# Patient Record
Sex: MALE | Race: WHITE | NOT HISPANIC OR LATINO | URBAN - METROPOLITAN AREA
[De-identification: names, ages, dates, MRNs, and addresses within clinical notes are randomized per-mention and may not be internally consistent; named-entity substitution may affect disease eponyms.]

---

## 2017-03-03 ENCOUNTER — EMERGENCY (EMERGENCY)
Facility: HOSPITAL | Age: 57
LOS: 1 days | Discharge: HOME | End: 2017-03-03
Attending: EMERGENCY MEDICINE
Payer: MEDICAID

## 2017-03-03 VITALS
SYSTOLIC BLOOD PRESSURE: 170 MMHG | RESPIRATION RATE: 16 BRPM | HEIGHT: 71 IN | HEART RATE: 78 BPM | OXYGEN SATURATION: 100 % | DIASTOLIC BLOOD PRESSURE: 110 MMHG | WEIGHT: 184.97 LBS | TEMPERATURE: 98 F

## 2017-03-03 VITALS
RESPIRATION RATE: 18 BRPM | DIASTOLIC BLOOD PRESSURE: 85 MMHG | TEMPERATURE: 98 F | OXYGEN SATURATION: 100 % | HEART RATE: 82 BPM | SYSTOLIC BLOOD PRESSURE: 135 MMHG

## 2017-03-03 LAB
APPEARANCE UR: CLEAR — SIGNIFICANT CHANGE UP
BACTERIA # UR AUTO: ABNORMAL /HPF
BASOPHILS # BLD AUTO: 0.1 K/UL — SIGNIFICANT CHANGE UP (ref 0–0.2)
BASOPHILS NFR BLD AUTO: 0.8 % — SIGNIFICANT CHANGE UP (ref 0–2)
BILIRUB UR-MCNC: NEGATIVE — SIGNIFICANT CHANGE UP
COLOR SPEC: YELLOW — SIGNIFICANT CHANGE UP
DIFF PNL FLD: ABNORMAL
EOSINOPHIL # BLD AUTO: 0 K/UL — SIGNIFICANT CHANGE UP (ref 0–0.5)
EOSINOPHIL NFR BLD AUTO: 0.2 % — SIGNIFICANT CHANGE UP (ref 0–6)
EPI CELLS # UR: ABNORMAL (ref 0–10)
GLUCOSE UR QL: NEGATIVE — SIGNIFICANT CHANGE UP
HCT VFR BLD CALC: 48.8 % — SIGNIFICANT CHANGE UP (ref 39–50)
HGB BLD-MCNC: 16.2 G/DL — SIGNIFICANT CHANGE UP (ref 13–17)
KETONES UR-MCNC: NEGATIVE — SIGNIFICANT CHANGE UP
LEUKOCYTE ESTERASE UR-ACNC: ABNORMAL
LYMPHOCYTES # BLD AUTO: 1.8 K/UL — SIGNIFICANT CHANGE UP (ref 1–3.3)
LYMPHOCYTES # BLD AUTO: 15 % — SIGNIFICANT CHANGE UP (ref 13–44)
MCHC RBC-ENTMCNC: 26.7 PG — LOW (ref 27–34)
MCHC RBC-ENTMCNC: 33.3 GM/DL — SIGNIFICANT CHANGE UP (ref 32–36)
MCV RBC AUTO: 80.4 FL — SIGNIFICANT CHANGE UP (ref 80–100)
MONOCYTES # BLD AUTO: 0.5 K/UL — SIGNIFICANT CHANGE UP (ref 0–0.9)
MONOCYTES NFR BLD AUTO: 4.2 % — SIGNIFICANT CHANGE UP (ref 2–14)
NEUTROPHILS # BLD AUTO: 9.7 K/UL — HIGH (ref 1.8–7.4)
NEUTROPHILS NFR BLD AUTO: 79.8 % — HIGH (ref 43–77)
NITRITE UR-MCNC: NEGATIVE — SIGNIFICANT CHANGE UP
PH UR: 5 — SIGNIFICANT CHANGE UP (ref 4.8–8)
PLATELET # BLD AUTO: 255 K/UL — SIGNIFICANT CHANGE UP (ref 150–400)
PROT UR-MCNC: NEGATIVE — SIGNIFICANT CHANGE UP
RBC # BLD: 6.07 M/UL — HIGH (ref 4.2–5.8)
RBC # FLD: 12.8 % — SIGNIFICANT CHANGE UP (ref 10.3–14.5)
RBC CASTS # UR COMP ASSIST: SIGNIFICANT CHANGE UP /HPF (ref 0–2)
SP GR SPEC: 1.02 — SIGNIFICANT CHANGE UP (ref 1.01–1.02)
UROBILINOGEN FLD QL: NEGATIVE — SIGNIFICANT CHANGE UP
WBC # BLD: 12.2 K/UL — HIGH (ref 3.8–10.5)
WBC # FLD AUTO: 12.2 K/UL — HIGH (ref 3.8–10.5)
WBC UR QL: SIGNIFICANT CHANGE UP /HPF (ref 0–5)

## 2017-03-03 PROCEDURE — 99285 EMERGENCY DEPT VISIT HI MDM: CPT | Mod: 25

## 2017-03-03 RX ORDER — SODIUM CHLORIDE 9 MG/ML
1000 INJECTION INTRAMUSCULAR; INTRAVENOUS; SUBCUTANEOUS ONCE
Qty: 0 | Refills: 0 | Status: COMPLETED | OUTPATIENT
Start: 2017-03-03 | End: 2017-03-03

## 2017-03-03 RX ORDER — SODIUM CHLORIDE 9 MG/ML
1000 INJECTION INTRAMUSCULAR; INTRAVENOUS; SUBCUTANEOUS
Qty: 0 | Refills: 0 | Status: DISCONTINUED | OUTPATIENT
Start: 2017-03-03 | End: 2017-03-07

## 2017-03-03 RX ORDER — KETOROLAC TROMETHAMINE 30 MG/ML
30 SYRINGE (ML) INJECTION ONCE
Qty: 0 | Refills: 0 | Status: DISCONTINUED | OUTPATIENT
Start: 2017-03-03 | End: 2017-03-03

## 2017-03-03 RX ORDER — SODIUM CHLORIDE 9 MG/ML
3 INJECTION INTRAMUSCULAR; INTRAVENOUS; SUBCUTANEOUS ONCE
Qty: 0 | Refills: 0 | Status: COMPLETED | OUTPATIENT
Start: 2017-03-03 | End: 2017-03-03

## 2017-03-03 RX ADMIN — SODIUM CHLORIDE 3 MILLILITER(S): 9 INJECTION INTRAMUSCULAR; INTRAVENOUS; SUBCUTANEOUS at 23:47

## 2017-03-03 RX ADMIN — Medication 30 MILLIGRAM(S): at 23:47

## 2017-03-03 RX ADMIN — SODIUM CHLORIDE 1000 MILLILITER(S): 9 INJECTION INTRAMUSCULAR; INTRAVENOUS; SUBCUTANEOUS at 23:49

## 2017-03-04 LAB
ALBUMIN SERPL ELPH-MCNC: 4.3 G/DL — SIGNIFICANT CHANGE UP (ref 3.5–5)
ALP SERPL-CCNC: 82 U/L — SIGNIFICANT CHANGE UP (ref 40–120)
ALT FLD-CCNC: 26 U/L DA — SIGNIFICANT CHANGE UP (ref 10–60)
ANION GAP SERPL CALC-SCNC: 9 MMOL/L — SIGNIFICANT CHANGE UP (ref 5–17)
AST SERPL-CCNC: 20 U/L — SIGNIFICANT CHANGE UP (ref 10–40)
BILIRUB SERPL-MCNC: 0.8 MG/DL — SIGNIFICANT CHANGE UP (ref 0.2–1.2)
BUN SERPL-MCNC: 15 MG/DL — SIGNIFICANT CHANGE UP (ref 7–18)
CALCIUM SERPL-MCNC: 9 MG/DL — SIGNIFICANT CHANGE UP (ref 8.4–10.5)
CHLORIDE SERPL-SCNC: 106 MMOL/L — SIGNIFICANT CHANGE UP (ref 96–108)
CO2 SERPL-SCNC: 27 MMOL/L — SIGNIFICANT CHANGE UP (ref 22–31)
CREAT SERPL-MCNC: 1.8 MG/DL — HIGH (ref 0.5–1.3)
GLUCOSE SERPL-MCNC: 107 MG/DL — HIGH (ref 70–99)
LIDOCAIN IGE QN: 121 U/L — SIGNIFICANT CHANGE UP (ref 73–393)
POTASSIUM SERPL-MCNC: 4.4 MMOL/L — SIGNIFICANT CHANGE UP (ref 3.5–5.3)
POTASSIUM SERPL-SCNC: 4.4 MMOL/L — SIGNIFICANT CHANGE UP (ref 3.5–5.3)
PROT SERPL-MCNC: 7.9 G/DL — SIGNIFICANT CHANGE UP (ref 6–8.3)
SODIUM SERPL-SCNC: 142 MMOL/L — SIGNIFICANT CHANGE UP (ref 135–145)

## 2017-03-04 PROCEDURE — 96374 THER/PROPH/DIAG INJ IV PUSH: CPT

## 2017-03-04 PROCEDURE — 80053 COMPREHEN METABOLIC PANEL: CPT

## 2017-03-04 PROCEDURE — 99284 EMERGENCY DEPT VISIT MOD MDM: CPT | Mod: 25

## 2017-03-04 PROCEDURE — 85027 COMPLETE CBC AUTOMATED: CPT

## 2017-03-04 PROCEDURE — 96375 TX/PRO/DX INJ NEW DRUG ADDON: CPT

## 2017-03-04 PROCEDURE — 87086 URINE CULTURE/COLONY COUNT: CPT

## 2017-03-04 PROCEDURE — 81001 URINALYSIS AUTO W/SCOPE: CPT

## 2017-03-04 PROCEDURE — 83690 ASSAY OF LIPASE: CPT

## 2017-03-04 PROCEDURE — 74176 CT ABD & PELVIS W/O CONTRAST: CPT | Mod: 26

## 2017-03-04 PROCEDURE — 74176 CT ABD & PELVIS W/O CONTRAST: CPT

## 2017-03-04 RX ORDER — MORPHINE SULFATE 50 MG/1
4 CAPSULE, EXTENDED RELEASE ORAL ONCE
Qty: 0 | Refills: 0 | Status: DISCONTINUED | OUTPATIENT
Start: 2017-03-04 | End: 2017-03-04

## 2017-03-04 RX ORDER — ONDANSETRON 8 MG/1
4 TABLET, FILM COATED ORAL ONCE
Qty: 0 | Refills: 0 | Status: COMPLETED | OUTPATIENT
Start: 2017-03-04 | End: 2017-03-04

## 2017-03-04 RX ORDER — TAMSULOSIN HYDROCHLORIDE 0.4 MG/1
1 CAPSULE ORAL
Qty: 30 | Refills: 0 | OUTPATIENT
Start: 2017-03-04 | End: 2017-04-03

## 2017-03-04 RX ADMIN — MORPHINE SULFATE 4 MILLIGRAM(S): 50 CAPSULE, EXTENDED RELEASE ORAL at 01:09

## 2017-03-04 RX ADMIN — ONDANSETRON 4 MILLIGRAM(S): 8 TABLET, FILM COATED ORAL at 01:10

## 2017-03-04 NOTE — ED PROVIDER NOTE - PROGRESS NOTE DETAILS
pt feeling much better, advised not to take Ibuprofen, to f/u with urologist. pt was told that he has uric acid stone

## 2017-03-05 LAB
CULTURE RESULTS: NO GROWTH — SIGNIFICANT CHANGE UP
SPECIMEN SOURCE: SIGNIFICANT CHANGE UP

## 2017-03-07 DIAGNOSIS — N23 UNSPECIFIED RENAL COLIC: ICD-10-CM

## 2017-03-07 DIAGNOSIS — N28.9 DISORDER OF KIDNEY AND URETER, UNSPECIFIED: ICD-10-CM

## 2017-03-07 DIAGNOSIS — M54.9 DORSALGIA, UNSPECIFIED: ICD-10-CM

## 2017-03-07 DIAGNOSIS — F17.210 NICOTINE DEPENDENCE, CIGARETTES, UNCOMPLICATED: ICD-10-CM

## 2017-03-08 ENCOUNTER — EMERGENCY (EMERGENCY)
Facility: HOSPITAL | Age: 57
LOS: 1 days | Discharge: HOME | End: 2017-03-08
Attending: EMERGENCY MEDICINE
Payer: MEDICAID

## 2017-03-08 VITALS
HEART RATE: 76 BPM | RESPIRATION RATE: 16 BRPM | OXYGEN SATURATION: 98 % | DIASTOLIC BLOOD PRESSURE: 68 MMHG | SYSTOLIC BLOOD PRESSURE: 103 MMHG | TEMPERATURE: 98 F

## 2017-03-08 VITALS
RESPIRATION RATE: 18 BRPM | WEIGHT: 179.9 LBS | DIASTOLIC BLOOD PRESSURE: 62 MMHG | TEMPERATURE: 98 F | HEIGHT: 71 IN | SYSTOLIC BLOOD PRESSURE: 164 MMHG | HEART RATE: 66 BPM | OXYGEN SATURATION: 99 %

## 2017-03-08 DIAGNOSIS — N23 UNSPECIFIED RENAL COLIC: ICD-10-CM

## 2017-03-08 DIAGNOSIS — F17.210 NICOTINE DEPENDENCE, CIGARETTES, UNCOMPLICATED: ICD-10-CM

## 2017-03-08 LAB
ANION GAP SERPL CALC-SCNC: 10 MMOL/L — SIGNIFICANT CHANGE UP (ref 5–17)
APPEARANCE UR: CLEAR — SIGNIFICANT CHANGE UP
BACTERIA # UR AUTO: ABNORMAL /HPF
BASOPHILS # BLD AUTO: 0.1 K/UL — SIGNIFICANT CHANGE UP (ref 0–0.2)
BASOPHILS NFR BLD AUTO: 1 % — SIGNIFICANT CHANGE UP (ref 0–2)
BILIRUB UR-MCNC: NEGATIVE — SIGNIFICANT CHANGE UP
BUN SERPL-MCNC: 28 MG/DL — HIGH (ref 7–18)
CALCIUM SERPL-MCNC: 8.5 MG/DL — SIGNIFICANT CHANGE UP (ref 8.4–10.5)
CHLORIDE SERPL-SCNC: 104 MMOL/L — SIGNIFICANT CHANGE UP (ref 96–108)
CO2 SERPL-SCNC: 25 MMOL/L — SIGNIFICANT CHANGE UP (ref 22–31)
COLOR SPEC: YELLOW — SIGNIFICANT CHANGE UP
CREAT SERPL-MCNC: 1.68 MG/DL — HIGH (ref 0.5–1.3)
DIFF PNL FLD: ABNORMAL
EOSINOPHIL # BLD AUTO: 0.1 K/UL — SIGNIFICANT CHANGE UP (ref 0–0.5)
EOSINOPHIL NFR BLD AUTO: 0.8 % — SIGNIFICANT CHANGE UP (ref 0–6)
EPI CELLS # UR: ABNORMAL (ref 0–10)
GLUCOSE SERPL-MCNC: 92 MG/DL — SIGNIFICANT CHANGE UP (ref 70–99)
GLUCOSE UR QL: NEGATIVE — SIGNIFICANT CHANGE UP
HCT VFR BLD CALC: 45.1 % — SIGNIFICANT CHANGE UP (ref 39–50)
HGB BLD-MCNC: 15.8 G/DL — SIGNIFICANT CHANGE UP (ref 13–17)
KETONES UR-MCNC: NEGATIVE — SIGNIFICANT CHANGE UP
LEUKOCYTE ESTERASE UR-ACNC: NEGATIVE — SIGNIFICANT CHANGE UP
LYMPHOCYTES # BLD AUTO: 3.7 K/UL — HIGH (ref 1–3.3)
LYMPHOCYTES # BLD AUTO: 32.5 % — SIGNIFICANT CHANGE UP (ref 13–44)
MCHC RBC-ENTMCNC: 28.1 PG — SIGNIFICANT CHANGE UP (ref 27–34)
MCHC RBC-ENTMCNC: 34.9 GM/DL — SIGNIFICANT CHANGE UP (ref 32–36)
MCV RBC AUTO: 80.5 FL — SIGNIFICANT CHANGE UP (ref 80–100)
MONOCYTES # BLD AUTO: 0.9 K/UL — SIGNIFICANT CHANGE UP (ref 0–0.9)
MONOCYTES NFR BLD AUTO: 8.3 % — SIGNIFICANT CHANGE UP (ref 2–14)
NEUTROPHILS # BLD AUTO: 6.5 K/UL — SIGNIFICANT CHANGE UP (ref 1.8–7.4)
NEUTROPHILS NFR BLD AUTO: 57.4 % — SIGNIFICANT CHANGE UP (ref 43–77)
NITRITE UR-MCNC: NEGATIVE — SIGNIFICANT CHANGE UP
PH UR: 5 — SIGNIFICANT CHANGE UP (ref 4.8–8)
PLATELET # BLD AUTO: 238 K/UL — SIGNIFICANT CHANGE UP (ref 150–400)
POTASSIUM SERPL-MCNC: 4 MMOL/L — SIGNIFICANT CHANGE UP (ref 3.5–5.3)
POTASSIUM SERPL-SCNC: 4 MMOL/L — SIGNIFICANT CHANGE UP (ref 3.5–5.3)
PROT UR-MCNC: 15
RBC # BLD: 5.61 M/UL — SIGNIFICANT CHANGE UP (ref 4.2–5.8)
RBC # FLD: 12.7 % — SIGNIFICANT CHANGE UP (ref 10.3–14.5)
RBC CASTS # UR COMP ASSIST: SIGNIFICANT CHANGE UP /HPF (ref 0–2)
SODIUM SERPL-SCNC: 139 MMOL/L — SIGNIFICANT CHANGE UP (ref 135–145)
SP GR SPEC: 1.02 — SIGNIFICANT CHANGE UP (ref 1.01–1.02)
UROBILINOGEN FLD QL: NEGATIVE — SIGNIFICANT CHANGE UP
WBC # BLD: 11.3 K/UL — HIGH (ref 3.8–10.5)
WBC # FLD AUTO: 11.3 K/UL — HIGH (ref 3.8–10.5)
WBC UR QL: SIGNIFICANT CHANGE UP /HPF (ref 0–5)

## 2017-03-08 PROCEDURE — 99284 EMERGENCY DEPT VISIT MOD MDM: CPT | Mod: 25

## 2017-03-08 PROCEDURE — 80048 BASIC METABOLIC PNL TOTAL CA: CPT

## 2017-03-08 PROCEDURE — 96375 TX/PRO/DX INJ NEW DRUG ADDON: CPT

## 2017-03-08 PROCEDURE — 81001 URINALYSIS AUTO W/SCOPE: CPT

## 2017-03-08 PROCEDURE — 87086 URINE CULTURE/COLONY COUNT: CPT

## 2017-03-08 PROCEDURE — 96374 THER/PROPH/DIAG INJ IV PUSH: CPT

## 2017-03-08 PROCEDURE — 85027 COMPLETE CBC AUTOMATED: CPT

## 2017-03-08 RX ORDER — MORPHINE SULFATE 50 MG/1
4 CAPSULE, EXTENDED RELEASE ORAL ONCE
Qty: 0 | Refills: 0 | Status: DISCONTINUED | OUTPATIENT
Start: 2017-03-08 | End: 2017-03-08

## 2017-03-08 RX ORDER — SODIUM CHLORIDE 9 MG/ML
3 INJECTION INTRAMUSCULAR; INTRAVENOUS; SUBCUTANEOUS ONCE
Qty: 0 | Refills: 0 | Status: COMPLETED | OUTPATIENT
Start: 2017-03-08 | End: 2017-03-08

## 2017-03-08 RX ORDER — HYDROMORPHONE HYDROCHLORIDE 2 MG/ML
1 INJECTION INTRAMUSCULAR; INTRAVENOUS; SUBCUTANEOUS ONCE
Qty: 0 | Refills: 0 | Status: DISCONTINUED | OUTPATIENT
Start: 2017-03-08 | End: 2017-03-08

## 2017-03-08 RX ORDER — SODIUM CHLORIDE 9 MG/ML
1000 INJECTION INTRAMUSCULAR; INTRAVENOUS; SUBCUTANEOUS ONCE
Qty: 0 | Refills: 0 | Status: COMPLETED | OUTPATIENT
Start: 2017-03-08 | End: 2017-03-08

## 2017-03-08 RX ADMIN — MORPHINE SULFATE 4 MILLIGRAM(S): 50 CAPSULE, EXTENDED RELEASE ORAL at 02:50

## 2017-03-08 RX ADMIN — SODIUM CHLORIDE 1000 MILLILITER(S): 9 INJECTION INTRAMUSCULAR; INTRAVENOUS; SUBCUTANEOUS at 02:50

## 2017-03-08 RX ADMIN — MORPHINE SULFATE 4 MILLIGRAM(S): 50 CAPSULE, EXTENDED RELEASE ORAL at 03:26

## 2017-03-08 RX ADMIN — SODIUM CHLORIDE 1000 MILLILITER(S): 9 INJECTION INTRAMUSCULAR; INTRAVENOUS; SUBCUTANEOUS at 04:56

## 2017-03-08 RX ADMIN — SODIUM CHLORIDE 3 MILLILITER(S): 9 INJECTION INTRAMUSCULAR; INTRAVENOUS; SUBCUTANEOUS at 02:55

## 2017-03-08 RX ADMIN — HYDROMORPHONE HYDROCHLORIDE 1 MILLIGRAM(S): 2 INJECTION INTRAMUSCULAR; INTRAVENOUS; SUBCUTANEOUS at 04:39

## 2017-03-08 RX ADMIN — MORPHINE SULFATE 4 MILLIGRAM(S): 50 CAPSULE, EXTENDED RELEASE ORAL at 03:18

## 2017-03-08 RX ADMIN — HYDROMORPHONE HYDROCHLORIDE 1 MILLIGRAM(S): 2 INJECTION INTRAMUSCULAR; INTRAVENOUS; SUBCUTANEOUS at 04:09

## 2017-03-08 NOTE — ED PROVIDER NOTE - CONSTITUTIONAL, MLM
normal... Well appearing, well nourished, awake, alert, oriented to person, place, time/situation and in mild distress due to pain, pacing around in ED. well nourished, awake, alert, oriented to person, place, time/situation and in mild distress due to pain, pacing around stretcher

## 2017-03-08 NOTE — ED ADULT NURSE REASSESSMENT NOTE - NS ED NURSE REASSESS COMMENT FT1
Pt is alert and oriented x 3 upon en counter. Pt is resting comfortably in bed, offers no complaint at this time.

## 2017-03-08 NOTE — ED PROVIDER NOTE - MEDICAL DECISION MAKING DETAILS
57 y/o M pt w/ R flank pain consistent w/ renal colic. Labs and CT reviewed from 3/3/17, will check CBC and renal function today, check UA r.o infection, morphine for pain, reassess.

## 2017-03-08 NOTE — ED PROVIDER NOTE - OBJECTIVE STATEMENT
57 y/o M pt w/ PMHx of R side Renal Colic was BIB EMS to ED c/o sharp, severe R flank pain today. Pt reports that his pain radiates to his R testicle. Pt states that he took ibuprofen 600mg to no relief. Pt notes that he was seen at [facility] on 3/3/17 when the same pain had started; pt was diagnosed w/ 2mm R UVJ stone. Pt states that he did not fill his prescription for Flomax or Percocet and was only taking ibuprofen. Pt notes that he didn't f/u w/ Urology and that his pain was manageable until tonight. Pt denies fever, nausea, vomiting, or any other complaints. NKDA.

## 2017-03-09 LAB
CULTURE RESULTS: NO GROWTH — SIGNIFICANT CHANGE UP
SPECIMEN SOURCE: SIGNIFICANT CHANGE UP

## 2017-06-10 ENCOUNTER — EMERGENCY (EMERGENCY)
Facility: HOSPITAL | Age: 57
LOS: 1 days | Discharge: HOME | End: 2017-06-10
Attending: EMERGENCY MEDICINE
Payer: MEDICAID

## 2017-06-10 VITALS
DIASTOLIC BLOOD PRESSURE: 106 MMHG | HEART RATE: 80 BPM | RESPIRATION RATE: 20 BRPM | TEMPERATURE: 99 F | SYSTOLIC BLOOD PRESSURE: 143 MMHG | WEIGHT: 186.95 LBS | OXYGEN SATURATION: 98 % | HEIGHT: 71 IN

## 2017-06-10 VITALS
WEIGHT: 177.91 LBS | HEIGHT: 71 IN | SYSTOLIC BLOOD PRESSURE: 121 MMHG | DIASTOLIC BLOOD PRESSURE: 99 MMHG | RESPIRATION RATE: 18 BRPM | OXYGEN SATURATION: 100 % | HEART RATE: 81 BPM | TEMPERATURE: 99 F

## 2017-06-10 DIAGNOSIS — N23 UNSPECIFIED RENAL COLIC: ICD-10-CM

## 2017-06-10 LAB
ALBUMIN SERPL ELPH-MCNC: 3.5 G/DL — SIGNIFICANT CHANGE UP (ref 3.5–5)
ALP SERPL-CCNC: 67 U/L — SIGNIFICANT CHANGE UP (ref 40–120)
ALT FLD-CCNC: 33 U/L DA — SIGNIFICANT CHANGE UP (ref 10–60)
ANION GAP SERPL CALC-SCNC: 7 MMOL/L — SIGNIFICANT CHANGE UP (ref 5–17)
APPEARANCE UR: CLEAR — SIGNIFICANT CHANGE UP
AST SERPL-CCNC: 24 U/L — SIGNIFICANT CHANGE UP (ref 10–40)
BASOPHILS # BLD AUTO: 0.1 K/UL — SIGNIFICANT CHANGE UP (ref 0–0.2)
BASOPHILS NFR BLD AUTO: 0.6 % — SIGNIFICANT CHANGE UP (ref 0–2)
BILIRUB SERPL-MCNC: 0.5 MG/DL — SIGNIFICANT CHANGE UP (ref 0.2–1.2)
BILIRUB UR-MCNC: NEGATIVE — SIGNIFICANT CHANGE UP
BUN SERPL-MCNC: 25 MG/DL — HIGH (ref 7–18)
CALCIUM SERPL-MCNC: 8.5 MG/DL — SIGNIFICANT CHANGE UP (ref 8.4–10.5)
CHLORIDE SERPL-SCNC: 106 MMOL/L — SIGNIFICANT CHANGE UP (ref 96–108)
CO2 SERPL-SCNC: 28 MMOL/L — SIGNIFICANT CHANGE UP (ref 22–31)
COLOR SPEC: YELLOW — SIGNIFICANT CHANGE UP
CREAT SERPL-MCNC: 1.67 MG/DL — HIGH (ref 0.5–1.3)
DIFF PNL FLD: ABNORMAL
EOSINOPHIL # BLD AUTO: 0 K/UL — SIGNIFICANT CHANGE UP (ref 0–0.5)
EOSINOPHIL NFR BLD AUTO: 0.4 % — SIGNIFICANT CHANGE UP (ref 0–6)
GLUCOSE SERPL-MCNC: 139 MG/DL — HIGH (ref 70–99)
GLUCOSE UR QL: NEGATIVE — SIGNIFICANT CHANGE UP
HCT VFR BLD CALC: 42.4 % — SIGNIFICANT CHANGE UP (ref 39–50)
HGB BLD-MCNC: 14.5 G/DL — SIGNIFICANT CHANGE UP (ref 13–17)
KETONES UR-MCNC: NEGATIVE — SIGNIFICANT CHANGE UP
LEUKOCYTE ESTERASE UR-ACNC: NEGATIVE — SIGNIFICANT CHANGE UP
LYMPHOCYTES # BLD AUTO: 1.6 K/UL — SIGNIFICANT CHANGE UP (ref 1–3.3)
LYMPHOCYTES # BLD AUTO: 17 % — SIGNIFICANT CHANGE UP (ref 13–44)
MCHC RBC-ENTMCNC: 27.6 PG — SIGNIFICANT CHANGE UP (ref 27–34)
MCHC RBC-ENTMCNC: 34.1 GM/DL — SIGNIFICANT CHANGE UP (ref 32–36)
MCV RBC AUTO: 80.8 FL — SIGNIFICANT CHANGE UP (ref 80–100)
MONOCYTES # BLD AUTO: 0.6 K/UL — SIGNIFICANT CHANGE UP (ref 0–0.9)
MONOCYTES NFR BLD AUTO: 6.1 % — SIGNIFICANT CHANGE UP (ref 2–14)
NEUTROPHILS # BLD AUTO: 7.3 K/UL — SIGNIFICANT CHANGE UP (ref 1.8–7.4)
NEUTROPHILS NFR BLD AUTO: 75.9 % — SIGNIFICANT CHANGE UP (ref 43–77)
NITRITE UR-MCNC: NEGATIVE — SIGNIFICANT CHANGE UP
PH UR: 5 — SIGNIFICANT CHANGE UP (ref 5–8)
PLATELET # BLD AUTO: 208 K/UL — SIGNIFICANT CHANGE UP (ref 150–400)
POTASSIUM SERPL-MCNC: 3.9 MMOL/L — SIGNIFICANT CHANGE UP (ref 3.5–5.3)
POTASSIUM SERPL-SCNC: 3.9 MMOL/L — SIGNIFICANT CHANGE UP (ref 3.5–5.3)
PROT SERPL-MCNC: 7.3 G/DL — SIGNIFICANT CHANGE UP (ref 6–8.3)
PROT UR-MCNC: 15
RBC # BLD: 5.25 M/UL — SIGNIFICANT CHANGE UP (ref 4.2–5.8)
RBC # FLD: 13 % — SIGNIFICANT CHANGE UP (ref 10.3–14.5)
SODIUM SERPL-SCNC: 141 MMOL/L — SIGNIFICANT CHANGE UP (ref 135–145)
SP GR SPEC: 1.02 — SIGNIFICANT CHANGE UP (ref 1.01–1.02)
UROBILINOGEN FLD QL: NEGATIVE — SIGNIFICANT CHANGE UP
WBC # BLD: 9.6 K/UL — SIGNIFICANT CHANGE UP (ref 3.8–10.5)
WBC # FLD AUTO: 9.6 K/UL — SIGNIFICANT CHANGE UP (ref 3.8–10.5)

## 2017-06-10 PROCEDURE — 99284 EMERGENCY DEPT VISIT MOD MDM: CPT

## 2017-06-10 PROCEDURE — 99284 EMERGENCY DEPT VISIT MOD MDM: CPT | Mod: 25

## 2017-06-10 PROCEDURE — 96374 THER/PROPH/DIAG INJ IV PUSH: CPT

## 2017-06-10 PROCEDURE — 87086 URINE CULTURE/COLONY COUNT: CPT

## 2017-06-10 PROCEDURE — 85027 COMPLETE CBC AUTOMATED: CPT

## 2017-06-10 PROCEDURE — 81001 URINALYSIS AUTO W/SCOPE: CPT

## 2017-06-10 PROCEDURE — 80053 COMPREHEN METABOLIC PANEL: CPT

## 2017-06-10 RX ORDER — KETOROLAC TROMETHAMINE 30 MG/ML
30 SYRINGE (ML) INJECTION ONCE
Qty: 0 | Refills: 0 | Status: DISCONTINUED | OUTPATIENT
Start: 2017-06-10 | End: 2017-06-10

## 2017-06-10 RX ORDER — SODIUM CHLORIDE 9 MG/ML
1000 INJECTION INTRAMUSCULAR; INTRAVENOUS; SUBCUTANEOUS ONCE
Qty: 0 | Refills: 0 | Status: COMPLETED | OUTPATIENT
Start: 2017-06-10 | End: 2017-06-10

## 2017-06-10 RX ORDER — IBUPROFEN 200 MG
1 TABLET ORAL
Qty: 56 | Refills: 0 | OUTPATIENT
Start: 2017-06-10 | End: 2017-06-24

## 2017-06-10 RX ORDER — TAMSULOSIN HYDROCHLORIDE 0.4 MG/1
1 CAPSULE ORAL
Qty: 30 | Refills: 0 | OUTPATIENT
Start: 2017-06-10 | End: 2017-07-10

## 2017-06-10 RX ADMIN — Medication 30 MILLIGRAM(S): at 15:54

## 2017-06-10 RX ADMIN — Medication 30 MILLIGRAM(S): at 17:33

## 2017-06-10 RX ADMIN — SODIUM CHLORIDE 1000 MILLILITER(S): 9 INJECTION INTRAMUSCULAR; INTRAVENOUS; SUBCUTANEOUS at 23:51

## 2017-06-10 RX ADMIN — Medication 30 MILLIGRAM(S): at 23:49

## 2017-06-10 RX ADMIN — SODIUM CHLORIDE 1000 MILLILITER(S): 9 INJECTION INTRAMUSCULAR; INTRAVENOUS; SUBCUTANEOUS at 15:54

## 2017-06-10 NOTE — ED PROVIDER NOTE - OBJECTIVE STATEMENT
58 y/o male with significant PMHx of kidney stones presents to the ED c/o R flank pain. Pt reports he has Hx of reoccurring kidney stones, and notes he passed 3cm stone x yesterday. Pt notes he has had laser treatment for stone in the past. Pt has not taken any medication for the pain at this time. Pt reports current pain feels like he is has a stone that he estimates 2-3 cm. Pt denies fever, dysuria, hematuria, urinary urgency/frequency, urine or bowel incontinence/dysfunction, discharge, bleeding, nausea, vomiting, diarrhea, or any other complaints. Urologist: Dr. Katya Pineda.

## 2017-06-10 NOTE — ED PROVIDER NOTE - CHPI ED SYMPTOMS NEG
no fever, dysuria, hematuria, urinary urgency/frequency, urine or bowel incontinence/dysfunction, discharge, bleeding, nausea, vomiting, diarrhea

## 2017-06-10 NOTE — ED PROVIDER NOTE - PROGRESS NOTE DETAILS
Pain improved, UA with no signs of infection.  Pt has urologist at [place], instructed to follow up with urologist, and given discharge precautions.

## 2017-06-10 NOTE — ED PROVIDER NOTE - MEDICAL DECISION MAKING DETAILS
58 y/o M with reoccurring kidney stones c/o flank pain. Will r/o infected stone.  CBC, CMP, UA, Uculture, and reassess.

## 2017-06-10 NOTE — ED PROVIDER NOTE - NS ED MD SCRIBE ATTENDING SCRIBE SECTIONS
DISPOSITION/REVIEW OF SYSTEMS/HISTORY OF PRESENT ILLNESS/PAST MEDICAL/SURGICAL/SOCIAL HISTORY/PHYSICAL EXAM/HIV/VITAL SIGNS( Pullset)

## 2017-06-11 LAB
CULTURE RESULTS: SIGNIFICANT CHANGE UP
SPECIMEN SOURCE: SIGNIFICANT CHANGE UP

## 2017-06-11 NOTE — ED PROVIDER NOTE - OBJECTIVE STATEMENT
58 y/o M pt with PMHx of Kidney Stones and no significant PSHx presents to ED c/o R flank pain since yesterday. As per pt, pt visited ED earlier today where a kidney stone in R UVJ was found, and pt was sent home with Oxycodone with Tylenol and Motrin; pt did not take the medication because he "knew it was not going to work". Pt returns to ED with continued R flank pain. Pt denies any other complaints. NKDA.

## 2017-06-11 NOTE — ED PROVIDER NOTE - CARDIAC, MLM
X Size Of Lesion In Cm (Optional): 0 Detail Level: Zone Consent: The risks of atrophy were reviewed with the patient. Total Volume (Ccs): 0.9 Medical Necessity Clause: This procedure was medically necessary because the lesions that were treated were: Administered By (Optional): Grabiel Grady MD Expiration Date For Kenalog (Optional): Aug 2018 Lot # For Kenalog (Optional): [id number] Kenalog Preparation: Kenalog Include Z78.9 (Other Specified Conditions Influencing Health Status) As An Associated Diagnosis?: No Concentration Of Kenalog Solution Injected (Mg/Ml): 10.0 Normal rate, regular rhythm.  Heart sounds S1, S2.  No murmurs, rubs or gallops.

## 2017-06-11 NOTE — ED PROVIDER NOTE - NS ED MD SCRIBE ATTENDING SCRIBE SECTIONS
HISTORY OF PRESENT ILLNESS/DISPOSITION/PHYSICAL EXAM/PAST MEDICAL/SURGICAL/SOCIAL HISTORY/REVIEW OF SYSTEMS/HIV/VITAL SIGNS( Pullset)

## 2017-06-11 NOTE — ED PROVIDER NOTE - MEDICAL DECISION MAKING DETAILS
58 y/o M pt presents with R flank pain since yesterday; pt was d/c earlier with the same sx's but was not compliant with medication. Plan to give Toradol and d/c home.

## 2017-06-15 ENCOUNTER — EMERGENCY (EMERGENCY)
Facility: HOSPITAL | Age: 57
LOS: 1 days | Discharge: HOME | End: 2017-06-15
Attending: EMERGENCY MEDICINE
Payer: MEDICAID

## 2017-06-15 VITALS
TEMPERATURE: 98 F | HEIGHT: 71 IN | OXYGEN SATURATION: 99 % | SYSTOLIC BLOOD PRESSURE: 140 MMHG | WEIGHT: 179.9 LBS | HEART RATE: 77 BPM | RESPIRATION RATE: 18 BRPM | DIASTOLIC BLOOD PRESSURE: 93 MMHG

## 2017-06-15 DIAGNOSIS — N20.0 CALCULUS OF KIDNEY: ICD-10-CM

## 2017-06-15 DIAGNOSIS — Z87.442 PERSONAL HISTORY OF URINARY CALCULI: ICD-10-CM

## 2017-06-15 LAB
ALBUMIN SERPL ELPH-MCNC: 3.6 G/DL — SIGNIFICANT CHANGE UP (ref 3.5–5)
ALP SERPL-CCNC: 65 U/L — SIGNIFICANT CHANGE UP (ref 40–120)
ALT FLD-CCNC: 27 U/L DA — SIGNIFICANT CHANGE UP (ref 10–60)
ANION GAP SERPL CALC-SCNC: 9 MMOL/L — SIGNIFICANT CHANGE UP (ref 5–17)
APPEARANCE UR: CLEAR — SIGNIFICANT CHANGE UP
AST SERPL-CCNC: 18 U/L — SIGNIFICANT CHANGE UP (ref 10–40)
BILIRUB SERPL-MCNC: 0.5 MG/DL — SIGNIFICANT CHANGE UP (ref 0.2–1.2)
BILIRUB UR-MCNC: NEGATIVE — SIGNIFICANT CHANGE UP
BUN SERPL-MCNC: 27 MG/DL — HIGH (ref 7–18)
CALCIUM SERPL-MCNC: 8.7 MG/DL — SIGNIFICANT CHANGE UP (ref 8.4–10.5)
CHLORIDE SERPL-SCNC: 107 MMOL/L — SIGNIFICANT CHANGE UP (ref 96–108)
CO2 SERPL-SCNC: 26 MMOL/L — SIGNIFICANT CHANGE UP (ref 22–31)
COLOR SPEC: YELLOW — SIGNIFICANT CHANGE UP
CREAT SERPL-MCNC: 1.66 MG/DL — HIGH (ref 0.5–1.3)
DIFF PNL FLD: ABNORMAL
GLUCOSE SERPL-MCNC: 68 MG/DL — LOW (ref 70–99)
GLUCOSE UR QL: NEGATIVE — SIGNIFICANT CHANGE UP
HCT VFR BLD CALC: 41.3 % — SIGNIFICANT CHANGE UP (ref 39–50)
HGB BLD-MCNC: 14.1 G/DL — SIGNIFICANT CHANGE UP (ref 13–17)
KETONES UR-MCNC: NEGATIVE — SIGNIFICANT CHANGE UP
LEUKOCYTE ESTERASE UR-ACNC: NEGATIVE — SIGNIFICANT CHANGE UP
MCHC RBC-ENTMCNC: 27.5 PG — SIGNIFICANT CHANGE UP (ref 27–34)
MCHC RBC-ENTMCNC: 34 GM/DL — SIGNIFICANT CHANGE UP (ref 32–36)
MCV RBC AUTO: 81 FL — SIGNIFICANT CHANGE UP (ref 80–100)
NITRITE UR-MCNC: NEGATIVE — SIGNIFICANT CHANGE UP
PH UR: 5 — SIGNIFICANT CHANGE UP (ref 5–8)
PLATELET # BLD AUTO: 308 K/UL — SIGNIFICANT CHANGE UP (ref 150–400)
POTASSIUM SERPL-MCNC: 4.5 MMOL/L — SIGNIFICANT CHANGE UP (ref 3.5–5.3)
POTASSIUM SERPL-SCNC: 4.5 MMOL/L — SIGNIFICANT CHANGE UP (ref 3.5–5.3)
PROT SERPL-MCNC: 7.6 G/DL — SIGNIFICANT CHANGE UP (ref 6–8.3)
PROT UR-MCNC: NEGATIVE — SIGNIFICANT CHANGE UP
RBC # BLD: 5.11 M/UL — SIGNIFICANT CHANGE UP (ref 4.2–5.8)
RBC # FLD: 12.3 % — SIGNIFICANT CHANGE UP (ref 10.3–14.5)
SODIUM SERPL-SCNC: 142 MMOL/L — SIGNIFICANT CHANGE UP (ref 135–145)
SP GR SPEC: 1.01 — SIGNIFICANT CHANGE UP (ref 1.01–1.02)
UROBILINOGEN FLD QL: NEGATIVE — SIGNIFICANT CHANGE UP
WBC # BLD: 9.5 K/UL — SIGNIFICANT CHANGE UP (ref 3.8–10.5)
WBC # FLD AUTO: 9.5 K/UL — SIGNIFICANT CHANGE UP (ref 3.8–10.5)

## 2017-06-15 PROCEDURE — 81001 URINALYSIS AUTO W/SCOPE: CPT

## 2017-06-15 PROCEDURE — 87086 URINE CULTURE/COLONY COUNT: CPT

## 2017-06-15 PROCEDURE — 80053 COMPREHEN METABOLIC PANEL: CPT

## 2017-06-15 PROCEDURE — 85027 COMPLETE CBC AUTOMATED: CPT

## 2017-06-15 PROCEDURE — 74176 CT ABD & PELVIS W/O CONTRAST: CPT | Mod: 26

## 2017-06-15 PROCEDURE — 99285 EMERGENCY DEPT VISIT HI MDM: CPT | Mod: 25

## 2017-06-15 PROCEDURE — 74176 CT ABD & PELVIS W/O CONTRAST: CPT

## 2017-06-15 PROCEDURE — 96374 THER/PROPH/DIAG INJ IV PUSH: CPT

## 2017-06-15 RX ORDER — SODIUM CHLORIDE 9 MG/ML
1000 INJECTION INTRAMUSCULAR; INTRAVENOUS; SUBCUTANEOUS ONCE
Qty: 0 | Refills: 0 | Status: COMPLETED | OUTPATIENT
Start: 2017-06-15 | End: 2017-06-15

## 2017-06-15 RX ORDER — MORPHINE SULFATE 50 MG/1
4 CAPSULE, EXTENDED RELEASE ORAL ONCE
Qty: 0 | Refills: 0 | Status: DISCONTINUED | OUTPATIENT
Start: 2017-06-15 | End: 2017-06-15

## 2017-06-15 RX ORDER — KETOROLAC TROMETHAMINE 30 MG/ML
30 SYRINGE (ML) INJECTION ONCE
Qty: 0 | Refills: 0 | Status: DISCONTINUED | OUTPATIENT
Start: 2017-06-15 | End: 2017-06-15

## 2017-06-15 RX ORDER — ACETAMINOPHEN 500 MG
2 TABLET ORAL
Qty: 30 | Refills: 0 | OUTPATIENT
Start: 2017-06-15 | End: 2017-06-19

## 2017-06-15 RX ORDER — TAMSULOSIN HYDROCHLORIDE 0.4 MG/1
1 CAPSULE ORAL
Qty: 7 | Refills: 0 | OUTPATIENT
Start: 2017-06-15 | End: 2017-06-22

## 2017-06-15 RX ORDER — TAMSULOSIN HYDROCHLORIDE 0.4 MG/1
0.4 CAPSULE ORAL AT BEDTIME
Qty: 0 | Refills: 0 | Status: DISCONTINUED | OUTPATIENT
Start: 2017-06-15 | End: 2017-06-19

## 2017-06-15 RX ADMIN — Medication 30 MILLIGRAM(S): at 05:22

## 2017-06-15 RX ADMIN — SODIUM CHLORIDE 1000 MILLILITER(S): 9 INJECTION INTRAMUSCULAR; INTRAVENOUS; SUBCUTANEOUS at 04:28

## 2017-06-15 RX ADMIN — TAMSULOSIN HYDROCHLORIDE 0.4 MILLIGRAM(S): 0.4 CAPSULE ORAL at 05:28

## 2017-06-15 RX ADMIN — Medication 30 MILLIGRAM(S): at 04:02

## 2017-06-15 NOTE — ED PROVIDER NOTE - NS ED MD SCRIBE ATTENDING SCRIBE SECTIONS
DISPOSITION/HIV/HISTORY OF PRESENT ILLNESS/PHYSICAL EXAM/VITAL SIGNS( Pullset)/REVIEW OF SYSTEMS/PAST MEDICAL/SURGICAL/SOCIAL HISTORY

## 2017-06-15 NOTE — ED PROVIDER NOTE - OBJECTIVE STATEMENT
58 y/o M pt w/ PMHx of kidney stones presents to the ED c/o  R flank pain x6 days. pt states that he has an appointment w. his Urologist at 13:45 today. Pt has history of kidney stones. Pt is w/o pain currently in the ED. Denies fever, chills, N/V/D, dysuria or any other complaints. NKDA.

## 2017-06-15 NOTE — ED PROVIDER NOTE - PROGRESS NOTE DETAILS
no pain, VSS, well appearing, asking tgh. Will tx flomax, nsaid, creatinine at baseline.  he will follow up w his urologist today. no pain, VSS, well appearing, asking tgh. Will tx flomax, nsaid, creatinine at baseline.  he will follow up w his urologist today. He will d/c the meds Rx at prior visits and continue the ones Rx tonight.

## 2017-06-15 NOTE — ED PROVIDER NOTE - MEDICAL DECISION MAKING DETAILS
58 y/o M pt w/ history of kidney stones presents today w/ R flank pain. Will obtain labs, imaging and dc home w/ pain meds and Urology follow up today.

## 2017-06-16 LAB
CULTURE RESULTS: NO GROWTH — SIGNIFICANT CHANGE UP
SPECIMEN SOURCE: SIGNIFICANT CHANGE UP

## 2018-09-24 NOTE — ED PROVIDER NOTE - CPE EDP SKIN NORM
Rene Condon came in for a repeat no-charge BP check. Bp's were running 123/72- 112/65 at home. Here it was 128/84-78. Dr. Argentina Baumgarten was notified. normal...

## 2019-11-13 ENCOUNTER — EMERGENCY (EMERGENCY)
Facility: HOSPITAL | Age: 59
LOS: 1 days | Discharge: HOME | End: 2019-11-13
Attending: EMERGENCY MEDICINE
Payer: MEDICAID

## 2019-11-13 VITALS
RESPIRATION RATE: 18 BRPM | DIASTOLIC BLOOD PRESSURE: 74 MMHG | SYSTOLIC BLOOD PRESSURE: 111 MMHG | HEART RATE: 85 BPM | TEMPERATURE: 98 F | WEIGHT: 179.9 LBS | OXYGEN SATURATION: 100 % | HEIGHT: 71 IN

## 2019-11-13 LAB
ALBUMIN SERPL ELPH-MCNC: 3.4 G/DL — LOW (ref 3.5–5)
ALP SERPL-CCNC: 79 U/L — SIGNIFICANT CHANGE UP (ref 40–120)
ALT FLD-CCNC: 30 U/L DA — SIGNIFICANT CHANGE UP (ref 10–60)
ANION GAP SERPL CALC-SCNC: 8 MMOL/L — SIGNIFICANT CHANGE UP (ref 5–17)
AST SERPL-CCNC: 17 U/L — SIGNIFICANT CHANGE UP (ref 10–40)
BASOPHILS # BLD AUTO: 0.06 K/UL — SIGNIFICANT CHANGE UP (ref 0–0.2)
BASOPHILS NFR BLD AUTO: 0.7 % — SIGNIFICANT CHANGE UP (ref 0–2)
BILIRUB SERPL-MCNC: 0.5 MG/DL — SIGNIFICANT CHANGE UP (ref 0.2–1.2)
BUN SERPL-MCNC: 21 MG/DL — HIGH (ref 7–18)
CALCIUM SERPL-MCNC: 8.6 MG/DL — SIGNIFICANT CHANGE UP (ref 8.4–10.5)
CHLORIDE SERPL-SCNC: 107 MMOL/L — SIGNIFICANT CHANGE UP (ref 96–108)
CO2 SERPL-SCNC: 26 MMOL/L — SIGNIFICANT CHANGE UP (ref 22–31)
CREAT SERPL-MCNC: 1.4 MG/DL — HIGH (ref 0.5–1.3)
EOSINOPHIL # BLD AUTO: 0.09 K/UL — SIGNIFICANT CHANGE UP (ref 0–0.5)
EOSINOPHIL NFR BLD AUTO: 1 % — SIGNIFICANT CHANGE UP (ref 0–6)
GLUCOSE SERPL-MCNC: 94 MG/DL — SIGNIFICANT CHANGE UP (ref 70–99)
HCT VFR BLD CALC: 48.5 % — SIGNIFICANT CHANGE UP (ref 39–50)
HGB BLD-MCNC: 15.7 G/DL — SIGNIFICANT CHANGE UP (ref 13–17)
IMM GRANULOCYTES NFR BLD AUTO: 0.3 % — SIGNIFICANT CHANGE UP (ref 0–1.5)
LIDOCAIN IGE QN: 155 U/L — SIGNIFICANT CHANGE UP (ref 73–393)
LYMPHOCYTES # BLD AUTO: 2.46 K/UL — SIGNIFICANT CHANGE UP (ref 1–3.3)
LYMPHOCYTES # BLD AUTO: 27.5 % — SIGNIFICANT CHANGE UP (ref 13–44)
MCHC RBC-ENTMCNC: 27.1 PG — SIGNIFICANT CHANGE UP (ref 27–34)
MCHC RBC-ENTMCNC: 32.4 GM/DL — SIGNIFICANT CHANGE UP (ref 32–36)
MCV RBC AUTO: 83.8 FL — SIGNIFICANT CHANGE UP (ref 80–100)
MONOCYTES # BLD AUTO: 0.68 K/UL — SIGNIFICANT CHANGE UP (ref 0–0.9)
MONOCYTES NFR BLD AUTO: 7.6 % — SIGNIFICANT CHANGE UP (ref 2–14)
NEUTROPHILS # BLD AUTO: 5.64 K/UL — SIGNIFICANT CHANGE UP (ref 1.8–7.4)
NEUTROPHILS NFR BLD AUTO: 62.9 % — SIGNIFICANT CHANGE UP (ref 43–77)
NRBC # BLD: 0 /100 WBCS — SIGNIFICANT CHANGE UP (ref 0–0)
PLATELET # BLD AUTO: 235 K/UL — SIGNIFICANT CHANGE UP (ref 150–400)
POTASSIUM SERPL-MCNC: 3.7 MMOL/L — SIGNIFICANT CHANGE UP (ref 3.5–5.3)
POTASSIUM SERPL-SCNC: 3.7 MMOL/L — SIGNIFICANT CHANGE UP (ref 3.5–5.3)
PROT SERPL-MCNC: 7 G/DL — SIGNIFICANT CHANGE UP (ref 6–8.3)
RBC # BLD: 5.79 M/UL — SIGNIFICANT CHANGE UP (ref 4.2–5.8)
RBC # FLD: 14.2 % — SIGNIFICANT CHANGE UP (ref 10.3–14.5)
SODIUM SERPL-SCNC: 141 MMOL/L — SIGNIFICANT CHANGE UP (ref 135–145)
WBC # BLD: 8.96 K/UL — SIGNIFICANT CHANGE UP (ref 3.8–10.5)
WBC # FLD AUTO: 8.96 K/UL — SIGNIFICANT CHANGE UP (ref 3.8–10.5)

## 2019-11-13 PROCEDURE — 99283 EMERGENCY DEPT VISIT LOW MDM: CPT

## 2019-11-13 PROCEDURE — 83690 ASSAY OF LIPASE: CPT

## 2019-11-13 PROCEDURE — 80053 COMPREHEN METABOLIC PANEL: CPT

## 2019-11-13 PROCEDURE — 99284 EMERGENCY DEPT VISIT MOD MDM: CPT

## 2019-11-13 PROCEDURE — 85027 COMPLETE CBC AUTOMATED: CPT

## 2019-11-13 PROCEDURE — 36415 COLL VENOUS BLD VENIPUNCTURE: CPT

## 2019-11-13 RX ORDER — SODIUM CHLORIDE 9 MG/ML
1000 INJECTION INTRAMUSCULAR; INTRAVENOUS; SUBCUTANEOUS ONCE
Refills: 0 | Status: COMPLETED | OUTPATIENT
Start: 2019-11-13 | End: 2019-11-13

## 2019-11-13 RX ADMIN — SODIUM CHLORIDE 1000 MILLILITER(S): 9 INJECTION INTRAMUSCULAR; INTRAVENOUS; SUBCUTANEOUS at 19:29

## 2019-11-13 NOTE — ED PROVIDER NOTE - TEMPLATE, MLM
OFFICE VISIT    CHIEF COMPLAINT:    Chief Complaint   Patient presents with   • Establish Care     SUBJECTIVE:   HISTORY OF PRESENT ILLNESS:  Stacey Lomax is a pleasant, , 27 year old female who presents to \[facility]\"" care as a new patient. She was referred to me by KINGA Mcgowan whom she sees for her gynecologic issues. She has seen Dr. Fry once but desires a different PCP. We discussed the following topics today:  · ADD/ADHD - Started taking Vyvanse around age 13-14 but states medication effects wore off after a few hours during school day. Thus, was switched to Adderall around age 16, which she took until about age 21. States it took some trial and error to determine proper dose, immediate release vs extended release, etc. She eventually stopped taking this due to health insurance issues and stated she did not seem to need it as she was not working. 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 She is now experiencing difficulty focusing and concentrating. States if she is interrupted, she quickly forgets what she was doing. For instance, her boss will come ask her to do something, and she gets sidetracked. At home, she will be putting away dishes, think of somethine else, and forget to put away 3 more plates, as an example. She recalls having difficulty sleeping in past when she took Adderall but was able to cope with \"sleepy time tea,\" more physical activity to make her tired, etc. She has contemplated pursuing re-starting medication to help her focus for some time and believes it is time to do this.  · History of cigarette smoking - Taking Wellbutrin  mg daily, which she states was prescribed for smoking cessation. However, she quit smoking April 2017 and tried to decrease her Wellbutrin dose by half (150 mg daily) and was unable to do so. States she began to feel \"sad,\" so she has continued taking full dose. Does not like being dependent on  this medication though.  · Oral herpes simplex - Taking valacyclovir daily for suppression of cold sores. States has had them since childhood (age 4-5) and had outbreaks that were severe and very frequent. Still has outbreaks now but these are less common.  · Allergic rhinitis - Uses Flonase prn, about once/week only and has not needed to take Claritin. Completed RAST as a child with no findings.  · Lactose intolerance - Changed diet on own to avoid dairy (especially ice cream), as this caused diarrhea. Now drinks Lactaid and has noticed significant improvement in symptoms. Was diagnosed with IBS in past that has mostly resolved with dietary changes and increased physical activity.  · Contraception - Takes oral contraceptive pills as prescribed by KINGA Mcgowan for contraception. She is up to date on Pap Smear cervical cancer screening.     Review of systems:    All pertinent systems reviewed - only positive findings are documented above in history of present illness. All other findings are negative.    OBJECTIVE:  PROBLEM LIST:    Patient Active Problem List   Diagnosis   • Oral herpes simplex, not currently active   • Lactose intolerance   • History of tobacco abuse   • ADD (attention deficit disorder)   • Allergic rhinitis     PAST HISTORIES:  I have reviewed the past medical history, family history, social history, medications, and allergies listed in the medical record.    ALLERGIES:  No Known Allergies     Current Outpatient Prescriptions   Medication Sig Dispense Refill   • phentermine (ADIPEX-P) 37.5 MG tablet Take 2 pills daily. One before breakfast and one  In Afternoon 60 tablet 1   • fluticasone (FLONASE) 50 MCG/ACT nasal spray SPRAY 2 SPRAYS IN EACH NOSTRIL DAILY. 1 Bottle 2   • buPROPion (WELLBUTRIN XL) 150 MG 24 hr tablet TAKE 1 TABLET BY MOUTH 2 TIMES DAILY. 60 tablet 2   • norethindrone-ethinyl estradiol-iron (BLISOVI FE 1.5/30) 1.5-30 MG-MCG tablet Take 1 tablet by mouth daily. 84 tablet 4    • valACYclovir (VALTREX) 500 MG tablet Take 1 tablet by mouth daily. 90 tablet 3   • Multiple Vitamin (MULTI-VITAMINS PO) Take  by mouth.       No current facility-administered medications for this visit.      Immunization History   Administered Date(s) Administered   • HPV Quadrivalent 05/23/2008, 09/19/2008, 02/09/2009   • INFLUENZA QUADRIVALENT 12/04/2015   • Influenza 11/21/2013, 10/24/2016   • Tdap 03/25/2011   Pended Date(s) Pended   • PPD 12/27/2013     Past Medical History:   Diagnosis Date   • ADD (attention deficit disorder) 8/11/2017   • Allergic rhinitis 8/11/2017   • Gastritis 2011   • IBS (irritable bowel syndrome) 2011   • Lactose intolerance 8/11/2017   • Oral herpes simplex, not currently active 4/22/2014    Takes valacyclovir daily for suppressive therapy   • Tobacco abuse disorder 11/22/2016     Past Surgical History:   Procedure Laterality Date   • TONSILLECTOMY AND ADENOIDECTOMY  2001     Social History     Social History   • Marital status: Single     Spouse name: N/A   • Number of children: N/A   • Years of education: N/A     Social History Main Topics   • Smoking status: Former Smoker     Types: Cigarettes     Quit date: 4/10/2017   • Smokeless tobacco: Never Used   • Alcohol use 0.0 oz/week      Comment: socially/weekends   • Drug use: No   • Sexual activity: Yes     Partners: Male     Birth control/ protection: OCP      Comment: started 1/8/2016     Other Topics Concern   • None     Social History Narrative   • None     Family History   Problem Relation Age of Onset   • Thyroid Mother      multiple nodules   • Diabetes Maternal Grandmother    • Cancer Paternal Grandfather      prostate cancer     Health Maintenance   Topic Date Due   • Influenza Vaccine (1) 09/01/2017   • Cervical Cancer Screening  04/14/2020   • DTaP/Tdap/Td Vaccine (2 - Td) 03/25/2021   • HPV (Female) Vaccine  Completed     Physical Exam:    Visit Vitals  /78 Comment: second blood pressure.   Pulse 102   Temp 98.5  °F (36.9 °C) (Oral)   Ht 5' 7.5\" (1.715 m)   Wt 73 kg   LMP 07/16/2017 (Exact Date)   SpO2 98%   BMI 24.84 kg/m²   General: Pleasant,  female of appropriate height and weight who is alert, cooperative, in no acute distress, and appears older than stated age.  Skin: Warm and dry without rash.  Head: Normocephalic-atraumatic.   Neck: Supple. No lymphadenopathy.   Eyes: Normal conjunctivae and sclerae bilaterally. PERRLA and EOMI bilaterally.  ENT: Mucous membranes are moist. Normal tympanic membranes and external auditory canals bilaterally. Normal nasal mucosa. No pharyngeal erythema or exudate. Tonsils surgically absent bilaterally. No facial tenderness.  Cardiovascular: S1, S2. Mild tachycardia with regular heart rhythm. No murmur.  Respiratory: Normal respiratory effort and rate. Lungs CTA bilaterally without wheezes, rales, or rhonchi. No cough.  Gastrointestinal: Abdomen soft and non-tender. Normal bowel sounds x 4. No hepatomegaly, splenomegaly, or masses.   Musculoskeletal: No gross deformity. Ambulates well without assistive device.  Neurologic: Alert and oriented x 4. No focal deficits or lateralizing signs.  Psychiatric: Pleasant and cooperative. Appropriate mood and affect.    LABORATORY / IMAGING RESULTS:  I have reviewed all pertinent laboratory / imaging results in the medical record.     Assessment & PLAN:    Stacey was seen today for establish care.    Diagnoses and all orders for this visit:    ADD (attention deficit disorder)  - See above HPI for detailed history. Discussed I do not prescribe medication for this condition but refer to behavioral health. Referral placed and list of clinic contact information provided to patient.  - Of note, patient is already taking Wellbutrin  mg daily and phentermine (for weight loss).  -     SERVICE TO BEHAVIORAL HEALTH    Well adult exam  -     COMPREHENSIVE METABOLIC PANEL; Future  -     LIPID PANEL WITH REFLEX; Future  -     CBC NO  DIFFERENTIAL; Future  -     THYROID STIMULATING HORMONE REFLEX; Future    Family history of thyroid disease  -     THYROID STIMULATING HORMONE REFLEX; Future    Lactose intolerance  - Controlled with dietary changes.    Oral herpes simplex, not currently active  - Takes valacyclovir 500 mg daily as suppressive therapy.    History of tobacco abuse  - Praised patient for continued smoking cessation.    Seasonal allergic rhinitis  - Uses Flonase prn, no need for Claritin lately.    Encounter to establish care with new doctor    Instructions provided as documented in the After Visit Summary. The patient indicated understanding and agreed with the plan of care.    Approximately 50 minutes were spent on this patient's clinic visit. Greater than 50% of this visit included counseling and coordination of care for this patient's health care needs.   Abdominal Pain, N/V/D

## 2019-11-13 NOTE — ED PROVIDER NOTE - OBJECTIVE STATEMENT
60 y/o M patient with a significant PMHx of Kidney stone and no significant PSHx presents to the ED with abdominal pain and diarrhea for x5 days. Patient reports he took Peptol Bismol today with no relief. Patient denies fever, chills, nausea, vomiting, bloody diarrhea, and any other complaints. NKDA.

## 2019-11-13 NOTE — ED PROVIDER NOTE - PROGRESS NOTE DETAILS
Results reviewed, including normal WBC count.  Patient requesting discharge.  Vital signs stable. Nontoxic and medically stable for discharge. Return precautions provided and patient understands to return to the ED for concerning or worsening signs and symptoms. Instructed to follow up with primary care physician and agreeable. Patient's questions answered.

## 2019-11-13 NOTE — ED PROVIDER NOTE - CLINICAL SUMMARY MEDICAL DECISION MAKING FREE TEXT BOX
Patient with abdominal pain. IV fluids, labs, lipase and reassess. 59 year old male diarrhea and with associated periumbilical/LLQ abd pain  IV fluids, labs, lipase and reassess.

## 2019-11-13 NOTE — ED PROVIDER NOTE - PATIENT PORTAL LINK FT
You can access the FollowMyHealth Patient Portal offered by [facility] by registering at the following website: [email or web address]. By joining [facility]’s FollowMyHealth portal, you will also be able to view your health information using other applications (apps) compatible with our system.

## 2019-11-14 PROBLEM — N20.0 CALCULUS OF KIDNEY: Chronic | Status: ACTIVE | Noted: 2017-06-11

## 2019-12-17 PROBLEM — Z00.00 ENCOUNTER FOR PREVENTIVE HEALTH EXAMINATION: Status: ACTIVE | Noted: 2019-12-17

## 2019-12-18 ENCOUNTER — APPOINTMENT (OUTPATIENT)
Dept: SURGICAL ONCOLOGY | Facility: CLINIC | Age: 59
End: 2019-12-18
Payer: MEDICAID

## 2019-12-18 VITALS
OXYGEN SATURATION: 100 % | BODY MASS INDEX: 26.85 KG/M2 | WEIGHT: 191.8 LBS | HEIGHT: 71 IN | DIASTOLIC BLOOD PRESSURE: 75 MMHG | HEART RATE: 73 BPM | SYSTOLIC BLOOD PRESSURE: 118 MMHG | TEMPERATURE: 98.7 F

## 2019-12-18 DIAGNOSIS — F17.200 NICOTINE DEPENDENCE, UNSPECIFIED, UNCOMPLICATED: ICD-10-CM

## 2019-12-18 DIAGNOSIS — Z87.442 PERSONAL HISTORY OF URINARY CALCULI: ICD-10-CM

## 2019-12-18 DIAGNOSIS — Z56.0 UNEMPLOYMENT, UNSPECIFIED: ICD-10-CM

## 2019-12-18 DIAGNOSIS — Z78.9 OTHER SPECIFIED HEALTH STATUS: ICD-10-CM

## 2019-12-18 LAB
CANCER AG19-9 SERPL-ACNC: 13 U/ML
CEA SERPL-MCNC: 3.6 NG/ML

## 2019-12-18 PROCEDURE — 99205 OFFICE O/P NEW HI 60 MIN: CPT

## 2019-12-18 RX ORDER — CHROMIUM 200 MCG
TABLET ORAL
Refills: 0 | Status: ACTIVE | COMMUNITY

## 2019-12-18 SDOH — ECONOMIC STABILITY - INCOME SECURITY: UNEMPLOYMENT, UNSPECIFIED: Z56.0

## 2019-12-18 NOTE — ASSESSMENT
[FreeTextEntry1] : 59 year old male who was experiencing rectal bleeding for one month.  He was referred for a CT A/P on 12/16/19 which showed diffuse relatively long segment thickening involving the distal sigmoid and proximal rectum which is nonspecific in CT appearance, compatible with mass or inflammation.  However there is associated juana enlargement in the nearby mesentery which is worrisome finding. Suggest colonoscopy.  Small 8 mm hypodense focus in the left lobe of the liver, indeterminate in appearance for which MRI is recommended.   Bilateral intrarenal calculi.  In addition, there is a 3 mm calculus in the mid left ureter, though without hydronephrosis or visible changes attributable to obstruction.  \par \par Colonoscopy 12/17/19- Internal hemorrhoids, large ulcerated recto sigmoid mass at 10-15 cm from the anus,  9 mm sessile polyp in the transverse colon, 8 mm sessile polyp in the ascending colon, 10 mm sessile polyp in the cecum.    Pathology is pending. \par \par PLAN:\par 1) Obtain pathology report\par 2) MRI Liver to eval for mets- lesion seen in left lobe of liver on CT abd\par 3) MRI Pelvis (rectal protocol) to assess for local staging\par 4) CXR \par 5) CEA \par 6) RTO 1 week \par

## 2019-12-18 NOTE — CONSULT LETTER
[Dear  ___] : Dear  [unfilled], [Consult Letter:] : I had the pleasure of evaluating your patient, [unfilled]. [( Thank you for referring [unfilled] for consultation for _____ )] : Thank you for referring [unfilled] for consultation for [unfilled] [Please see my note below.] : Please see my note below. [Consult Closing:] : Thank you very much for allowing me to participate in the care of this patient.  If you have any questions, please do not hesitate to contact me. [Sincerely,] : Sincerely, [FreeTextEntry3] : Te Turcios MD, FICS, FACS\par , Surgical Oncology\par [facility]\par [address]\[place]- [address]\par \par [address]\[place], [place]- [address]\par \par (mob) [phone]\par (o) [phone]\par (f) [phone]\par

## 2019-12-18 NOTE — PHYSICAL EXAM
[Normal] : supple, no neck mass and thyroid not enlarged [Normal Neck Lymph Nodes] : normal neck lymph nodes  [Normal Supraclavicular Lymph Nodes] : normal supraclavicular lymph nodes [Normal Axillary Lymph Nodes] : normal axillary lymph nodes [Normal Groin Lymph Nodes] : normal groin lymph nodes [Normal] : oriented to person, place and time, with appropriate affect [FreeTextEntry1] : no palpable mass ; blood per rectum

## 2019-12-18 NOTE — REVIEW OF SYSTEMS
[Patient Intake Form Reviewed] : Patient intake form was reviewed [Negative] : Endocrine [FreeTextEntry8] : rectal bleeding

## 2019-12-18 NOTE — HISTORY OF PRESENT ILLNESS
[de-identified] : Mr. KAVON DELANEY  is a 59 year  old male  presenting for an initial consultation, referred by Dr. Jignesh Javier. \par \par The patient was experiencing rectal bleeding for 2-3 months.  He states he sought care at [place] [facility] [facility] ED about 1.5 months ago for bloody diarrhea and was only discharged home on Imodium.  No workup was done in the hospital.  He then sought care with his PCP who thought it may be related to hemorrhoids and he was started on Preparation H and sitz baths with no improvement.  His PCP then referred her to GI, Dr. Javier.  Dr. Javier scoped him in the office and saw a concerning rectosigmoid mass.  He was then immediately referred for a CT A/P and scheduled for a next day colonoscopy.  CT A/P performed on 12/16/19 showed diffuse relatively long segment thickening involving the distal sigmoid and proximal rectum which is nonspecific in CT appearance, compatible with mass or inflammation.  However there is associated juana enlargement in the nearby mesentery which is worrisome finding. Suggest colonoscopy.  Small 8 mm hypodense focus in the left lobe of the liver, indeterminate in appearance for which MRI is recommended.   Bilateral intrarenal calculi.  In addition, there is a 3 mm calculus in the mid left ureter, though without hydronephrosis or visible changes attributable to obstruction.  \par \par Colonoscopy 12/17/19- Internal hemorrhoids, large ulcerated recto sigmoid mass at 10-15 cm from the anus,  9 mm sessile polyp in the transverse colon, 8 mm sessile polyp in the ascending colon, 10 mm sessile polyp in the cecum.  \par \par Pathology is pending.  \par \par PMH/PSH: Kidney stones s/p lithotripsy.  No history of CAD, HTN, heart attacks, strokes or diabetes.  No pacemaker or defibrillator.  Does not take any blood thinners. \par \par Denies family history of malignancies. \par \par Today he is with c/o ongoing rectal bleeding however denies abdominal pain, nausea, vomiting, changes in appetite or unintentional weight loss.  Feeling well overall.  \par \par GI: Dr. Jignesh Javier\par PCP: Dr. Marcleo Priest \par

## 2019-12-24 ENCOUNTER — APPOINTMENT (OUTPATIENT)
Dept: SURGICAL ONCOLOGY | Facility: CLINIC | Age: 59
End: 2019-12-24
Payer: MEDICAID

## 2019-12-24 VITALS
DIASTOLIC BLOOD PRESSURE: 72 MMHG | WEIGHT: 176 LBS | HEIGHT: 71 IN | HEART RATE: 88 BPM | SYSTOLIC BLOOD PRESSURE: 111 MMHG | OXYGEN SATURATION: 97 % | BODY MASS INDEX: 24.64 KG/M2 | RESPIRATION RATE: 15 BRPM

## 2019-12-24 PROCEDURE — 99215 OFFICE O/P EST HI 40 MIN: CPT

## 2019-12-25 NOTE — ASSESSMENT
[FreeTextEntry1] : 59 year old male who was experiencing rectal bleeding for one month.  He was referred for a CT A/P on 12/16/19 which showed diffuse relatively long segment thickening involving the distal sigmoid and proximal rectum which is nonspecific in CT appearance, compatible with mass or inflammation.  However there is associated juana enlargement in the nearby mesentery which is worrisome finding. Suggest colonoscopy.  Small 8 mm hypodense focus in the left lobe of the liver, indeterminate in appearance for which MRI is recommended.   Bilateral intrarenal calculi.  In addition, there is a 3 mm calculus in the mid left ureter, though without hydronephrosis or visible changes attributable to obstruction.  \par \par Colonoscopy 12/17/19- Internal hemorrhoids, large ulcerated recto sigmoid mass at 10-15 cm from the anus,  9 mm sessile polyp in the transverse colon, 8 mm sessile polyp in the ascending colon, 10 mm sessile polyp in the cecum.    Pathology is pending. MRI abd 12/19/19 found a 8mm mass in the left lobe of the liver highly suspicious for metastatic disease.  A 11mm cyst in the uncinate process of the pancreas without worrisome features was also noted.  MRI pelvis showed a large nonobstructing mass within the mid rectum and distal sigmoid measuring 9cm in length approximately 8cm from the anal verge, with extensive irregular wall thickening.  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 Dorsey- rad/onc for neoadjuvant chemoradiation.\par Will have his [facility] images uploaded to PACS. IR review for possible left lobe liver implant for biopsy and fiducial placement for future identification for hepatectomy \par I have discussed the diagnosis, therapeutic plan and options with the patient at length. Patient expressed verbal understanding to proceed with proposed plan. All questions answered.\par

## 2019-12-25 NOTE — CONSULT LETTER
[Dear  ___] : Dear  [unfilled], [( Thank you for referring [unfilled] for consultation for _____ )] : Thank you for referring [unfilled] for consultation for [unfilled] [Consult Letter:] : I had the pleasure of evaluating your patient, [unfilled]. [Consult Closing:] : Thank you very much for allowing me to participate in the care of this patient.  If you have any questions, please do not hesitate to contact me. [Please see my note below.] : Please see my note below. [Sincerely,] : Sincerely, [DrMaricruz  ___] : Dr. JOHN [DrMaricruz ___] : Dr. JOHN [FreeTextEntry3] : Te Turcios MD, FICS, FACS\par , Surgical Oncology\par [facility]\par [address]\par [place], [place]- [address]\par \par [address]\par [place], [place]- [address]\par \par (mob) [phone]\par (o) [phone]\par (f) [phone]\par

## 2019-12-25 NOTE — HISTORY OF PRESENT ILLNESS
[de-identified] : Mr. Carlos Lyons  is a 59 year  old male  presenting for a follow up exam.\par \par The patient was experiencing rectal bleeding for 2-3 months.  He states he sought care at [facility] ED about 1.5 months ago for bloody diarrhea and was only discharged home on Imodium.  No workup was done in the hospital.  He then sought care with his PCP who thought it may be related to hemorrhoids and he was started on Preparation H and sitz baths with no improvement.  His PCP then referred her to GI, Dr. Javier.  Dr. Javier scoped him in the office and saw a concerning rectosigmoid mass.  He was then immediately referred for a CT A/P and scheduled for a next day colonoscopy.  CT A/P performed on 12/16/19 showed diffuse relatively long segment thickening involving the distal sigmoid and proximal rectum which is nonspecific in CT appearance, compatible with mass or inflammation.  However there is associated juana enlargement in the nearby mesentery which is worrisome finding. Suggest colonoscopy.  Small 8 mm hypodense focus in the left lobe of the liver, indeterminate in appearance for which MRI is recommended.   Bilateral intrarenal calculi.  In addition, there is a 3 mm calculus in the mid left ureter, though without hydronephrosis or visible changes attributable to obstruction.  \par \par Colonoscopy 12/17/19- Internal hemorrhoids, large ulcerated recto sigmoid mass at 10-15 cm from the anus,  9 mm sessile polyp in the transverse colon, 8 mm sessile polyp in the ascending colon, 10 mm sessile polyp in the cecum.  \par \par Pathology is pending.  \par MRI abd 12/19/19 found a 8mm mass in the left lobe of the liver highly suspicious for metastatic disease.  A 11mm cyst in the uncinate process of the pancreas without worrisome features was also noted.  MRI pelvis showed a large nonobstructing mass within the mid rectum and distal sigmoid measuring 9cm in length approximately 8cm from the anal verge, with extensive irregular wall thickening.  T4AN2 tumor clinically.\par \par Bloodwork:\par CEA: 3.6 ng/mL (normal)\par CA 19-9: 13U/mL (normal)\par PMH/PSH: Kidney stones s/p lithotripsy.  No history of CAD, HTN, heart attacks, strokes or diabetes.  No pacemaker or defibrillator.  Does not take any blood thinners. \par \par Patient states that he does not believe in chemotherapy however will undergo surgery if needed.  He states that mixing small amounts of baking soda and/or peroxide with water will cure him over time.\par \par Continues to have rectal bleeding with some mild discomfort.\par \par Denies family history of malignancies. \par \par Today he is with c/o ongoing rectal bleeding however denies abdominal pain, nausea, vomiting, changes in appetite or unintentional weight loss.  Feeling well overall.  \par \par GI: Dr. Jignesh Javier\par PCP: Dr. Marcelo Priest

## 2019-12-25 NOTE — PHYSICAL EXAM
[Normal] : supple, no neck mass and thyroid not enlarged [Normal Neck Lymph Nodes] : normal neck lymph nodes  [Normal Groin Lymph Nodes] : normal groin lymph nodes [Normal Supraclavicular Lymph Nodes] : normal supraclavicular lymph nodes [Normal Axillary Lymph Nodes] : normal axillary lymph nodes [Normal] : oriented to person, place and time, with appropriate affect [de-identified] : not distended, non tender, no masses, no hepatosplenomegaly.

## 2020-01-28 ENCOUNTER — APPOINTMENT (OUTPATIENT)
Dept: SURGICAL ONCOLOGY | Facility: CLINIC | Age: 60
End: 2020-01-28

## 2020-10-27 ENCOUNTER — APPOINTMENT (OUTPATIENT)
Dept: SURGICAL ONCOLOGY | Facility: CLINIC | Age: 60
End: 2020-10-27
Payer: MEDICAID

## 2020-10-27 VITALS
DIASTOLIC BLOOD PRESSURE: 71 MMHG | WEIGHT: 154 LBS | HEART RATE: 85 BPM | BODY MASS INDEX: 21.56 KG/M2 | SYSTOLIC BLOOD PRESSURE: 102 MMHG | RESPIRATION RATE: 15 BRPM | OXYGEN SATURATION: 98 % | HEIGHT: 71 IN

## 2020-10-27 DIAGNOSIS — C19 MALIGNANT NEOPLASM OF RECTOSIGMOID JUNCTION: ICD-10-CM

## 2020-10-27 PROCEDURE — 99212 OFFICE O/P EST SF 10 MIN: CPT

## 2020-10-27 PROCEDURE — 99072 ADDL SUPL MATRL&STAF TM PHE: CPT

## 2020-10-29 PROBLEM — C19 RECTOSIGMOID CANCER: Status: ACTIVE | Noted: 2019-12-18

## 2020-10-29 NOTE — HISTORY OF PRESENT ILLNESS
[de-identified] : Mr. 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 Turcios.\par Patient subsequently elected to receive treatment elsewhere, where he received neoadjuvant chemotherapy with radiographic resolution of his liver metastasis.\par He went on to receive chemoradiation therapy, but was found on surveillance imaging to have rapid return and progression of his liver metastasis.  He is in the process of seeking opinions for treatment, possibly enrolling in clinical trial.\par He has self referred for evaluation.  He is upset at the status of his cancer.

## 2020-10-29 NOTE — ASSESSMENT
[FreeTextEntry1] : Progressive Stage IV rectal cancer.\par I explained to him that there are no surgical options to improve his cancer survival.\par Systemic treatment is his only chance tp prolong survival.\par I have offered to refer him to [facility] [place] should he be interested in seeking oncology opinion at [facility].\par I encouraged him to follow up with his established oncologist.\par May follow up in office as needed.

## 2020-10-29 NOTE — REASON FOR VISIT
[Follow-Up Visit] : a follow-up visit for [Family Member] : family member [FreeTextEntry2] : metastatic rectal cancer

## 2021-04-27 NOTE — ED ADULT NURSE NOTE - CCCP TRG CHIEF CMPLNT
Spoke with [employer] office and patient is all set for her apt tomorrow. Patient is aware that all is good for her apt tomorrow. Patient v/u. flank pain

## 2022-01-19 NOTE — ED ADULT NURSE NOTE - NSFALLRSKOUTCOME_ED_ALL_ED
Several family members tested positive for covid, pt is requesting testing.  No current sx/s  
Universal Safety Interventions

## 2022-03-14 NOTE — ED PROVIDER NOTE - ENMT, MLM
98.2
Airway patent, Nasal mucosa clear. Mouth with normal mucosa. Throat has no vesicles, no oropharyngeal exudates and uvula is midline.

## 2022-06-08 NOTE — ED ADULT NURSE NOTE - CADM POA PRESS ULCER
Doxycycline Pregnancy And Lactation Text: This medication is Pregnancy Category D and not consider safe during pregnancy. It is also excreted in breast milk but is considered safe for shorter treatment courses. No

## 2022-09-19 NOTE — ED PROVIDER NOTE - EXITCARE/DISCHARGE INSTRUCTIONS
Launch [employer] and print the 'Prescriptions from this Visit' Report
Symptomatic with suspected or confirmed COVID-19. Defer administration of Influenza Vaccine at this time

## 2024-06-12 NOTE — ED ADULT NURSE NOTE - INTEGUMENTARY WDL
Seek medical attention immediately if you have any shortness of breath, trouble swallowing or are grasping for air.  Watch out for the following signs of infections such as fever( temperature above 100.3),chills, color drainage. If there is any signs of bleeding, bruises that do not go away let your provider know about them. If you have ongoing nausea, vomiting or diarrhea contact your provider. When taking narcotic medication do not operate any machinery. If your pain is not managed with the prescribed medication contact your provider. 
Color consistent with ethnicity/race, warm, dry intact, resilient.

## 2024-08-31 NOTE — ED PROVIDER NOTE - NS ED ATTENDING STATEMENT MOD
body fluid culture - numerous Stenotrophomonas maltophilia TREVOR Gordon assigned to patient notified right after receiving critical I personally performed the services described in the documentation, reviewed the documentation recorded by the scribe in my presence and it accurately and completely records my words and action.

## 2024-10-04 NOTE — ED PROVIDER NOTE - NS ED NOTE AC HIGH RISK COUNTRIES
Detail Level: Zone
Patient Specific Otc Recommendations (Will Not Stick From Patient To Patient): Dove or Aveeno gentle skin products
No

## 2024-10-18 NOTE — ED PROVIDER NOTE - CARDIAC, MLM
Normal rate, regular rhythm.  Heart sounds S1, S2.  No murmurs, rubs or gallops. no hematuria/no renal colic/no flank pain L/no flank pain R/no dysuria

## 2025-03-21 NOTE — ED PROVIDER NOTE - ATTENDING CONTRIBUTION TO CARE
Date of Service:  4/4/2025    Chief Complaint:  Elevated PSA     History of Present Illness:  The patient presents today 6 months since he saw  since I last saw him, his medical records were reviewed and in summary Louie Sánchez is a 88 year old male with a history of elevated PSA. His most recent PSA is 2.47 from 3/27/25.     MRI prostate (7/9/2024): Prostate volume approximately 32.9 cubic cm. PI-RADS 5 lesion involving the right peripheral zone apex/mid gland is detailed. Changes of prostatitis and BPH which can potentially obscure additional low-grade underlying lesions.    Patient presents today he is doing well since he was last seen. He has been on Flomax 0.4mg and Finasteride 5mg in the past. He denies any urgency and frequency. Patient denies any fever, chills, or hematuria. He does not feel like he has an infection.     He was able to provide us with a urinary flow study today in the office which showed a obstructive flow pattern with a peak flow rate of 2.9mL/s and 13 mL voided.  I ordered and reviewed a postvoid residual which is 1 mL.     Independent Historian:  Yes with family.     PSA Test Results:  Lab Results   Component Value Date    PSA 2.47 03/27/2025    PSA 1.54 08/29/2024    PSA 18.40 (H) 05/22/2024       Past Medical History:   Past Medical History:   Diagnosis Date    Elevated PSA, between 10 and less than 20 ng/ml 05/22/2024    PSA of 18.4 w/ low %PSA       Surgical History:  Past Surgical History:   Procedure Laterality Date    Cabg, vein, single  2000    Colon surgery  1984    Divericulositis, Colostomy    Cystoscopy  2004    After UTI, \"nl\"   :    Family History:  Family history of  (Genitourinary) malignancy - none  Family History   Problem Relation Age of Onset    Genitourinary Neg Hx        Social History:  Social History     Socioeconomic History    Marital status: /Civil Union     Spouse name: Aarti    Number of children: 2    Years of education: 5    Highest  education level: Not on file   Occupational History    Occupation: [employer]   Tobacco Use    Smoking status: Former     Current packs/day: 0.00     Types: Cigarettes     Quit date: 1984     Years since quittin.2    Smokeless tobacco: Never   Substance and Sexual Activity    Alcohol use: Never    Drug use: Not on file    Sexual activity: Not Currently   Other Topics Concern    Not on file   Social History Narrative    Not on file     Social Determinants of Health     Financial Resource Strain: Not on file   Food Insecurity: Not on file   Transportation Needs: Not on file   Physical Activity: Not on file   Stress: Not on file   Social Connections: Not on file   Interpersonal Safety: Low Risk  (2024)    Interpersonal Safety     How often physically hurt: Never     How often insulted or talked down to: Never     How often threatened with harm: Never     How often scream or curse at: Never       Allergies:  ALLERGIES:   Allergen Reactions    Iodine   (Environmental Or Med) ANXIETY and RASH    Atorvastatin Other (See Comments)     Caused bleeding    Lovastatin Other (See Comments)    Sulfa Antibiotics Other (See Comments)       Medications:  Current Outpatient Medications   Medication Sig Dispense Refill    finasteride (PROSCAR) 5 MG tablet Take 1 tablet by mouth daily. 90 tablet 1    tamsulosin (FLOMAX) 0.4 MG Cap Take 1 capsule by mouth daily after a meal. Begin taking on 2024. ONE HALF HOUR AFTER EVENING MEAL 90 capsule 1    amLODIPine (NORVASC) 5 MG tablet Take 5 mg by mouth daily.      atenolol (TENORMIN) 25 MG tablet Take 25 mg by mouth daily.      lisinopril (ZESTRIL) 20 MG tablet Take 20 mg by mouth daily.      pantoprazole (PROTONIX) 40 MG tablet [None received]      pravastatin (PRAVACHOL) 80 MG tablet 80 mg.      rosuvastatin (CRESTOR) 20 MG tablet [None received]       No current facility-administered medications for this visit.       Allergies, Medications, Past Medical History, Past  Surgical History, Family History, and Social History were reviewed today.    Physical Exam:  Constitutional:  Visit Vitals  BP (!) 209/85   Pulse 64   Resp 14   Ht 5' 7\" (1.702 m)   Wt 72.1 kg (159 lb)   BMI 24.90 kg/m²     Well developed, well nourished, and afebrile    Genitourinary: not performed     Tests Reviewed:  PSA, PVR, and Uroflow      Assessment and Plan:  Therefore, we discussed where to go from here.    88 year old with history of symptomatic Proteus urinary tract infection that resolved with antibiotic therapy.  He remains symptom-free following antibiotic therapy.    His follow-up PSA reverted to normal.    At 88 with no voiding symptoms and a single UTI that responded to treatment, the patient can follow-up on a as needed basis    On, 04/04/25, Reanna ERAZO, scribed the services personally performed by Sebastien Danielle MD.    The documentation recorded by the scribe accurately and completely reflects the service(s) I personally performed and the decisions made by me.      59 presents to ED with abdominal pain and diarrhea x 5 days    took Pepto-Bismol with no relief  abdomen soft LLq    IVF labs lipase